# Patient Record
Sex: FEMALE | Race: WHITE | Employment: FULL TIME | URBAN - METROPOLITAN AREA
[De-identification: names, ages, dates, MRNs, and addresses within clinical notes are randomized per-mention and may not be internally consistent; named-entity substitution may affect disease eponyms.]

---

## 2018-01-10 ENCOUNTER — HOSPITAL ENCOUNTER (EMERGENCY)
Age: 48
Discharge: HOME | End: 2018-01-10

## 2018-01-10 ENCOUNTER — APPOINTMENT (OUTPATIENT)
Dept: GENERAL RADIOLOGY | Age: 48
End: 2018-01-10

## 2018-01-10 VITALS
OXYGEN SATURATION: 99 % | HEIGHT: 70 IN | DIASTOLIC BLOOD PRESSURE: 88 MMHG | SYSTOLIC BLOOD PRESSURE: 155 MMHG | BODY MASS INDEX: 17.18 KG/M2 | RESPIRATION RATE: 16 BRPM | HEART RATE: 78 BPM | WEIGHT: 120 LBS | TEMPERATURE: 97.8 F

## 2018-01-10 DIAGNOSIS — S93.401A SPRAIN OF RIGHT ANKLE, UNSPECIFIED LIGAMENT, INITIAL ENCOUNTER: ICD-10-CM

## 2018-01-10 DIAGNOSIS — S40.012A CONTUSION OF LEFT SHOULDER, INITIAL ENCOUNTER: Primary | ICD-10-CM

## 2018-01-10 DIAGNOSIS — S93.601A RIGHT FOOT SPRAIN, INITIAL ENCOUNTER: ICD-10-CM

## 2018-01-10 PROCEDURE — 99283 EMERGENCY DEPT VISIT LOW MDM: CPT

## 2018-01-10 PROCEDURE — 73030 X-RAY EXAM OF SHOULDER: CPT

## 2018-01-10 PROCEDURE — 73630 X-RAY EXAM OF FOOT: CPT

## 2018-01-10 PROCEDURE — 73610 X-RAY EXAM OF ANKLE: CPT

## 2018-01-10 RX ORDER — CHLORZOXAZONE 500 MG/1
500 TABLET ORAL 3 TIMES DAILY PRN
Qty: 15 TABLET | Refills: 0 | Status: SHIPPED | OUTPATIENT
Start: 2018-01-10 | End: 2018-01-20

## 2018-01-10 RX ORDER — NAPROXEN 500 MG/1
500 TABLET ORAL 2 TIMES DAILY WITH MEALS
Qty: 14 TABLET | Refills: 0 | Status: SHIPPED | OUTPATIENT
Start: 2018-01-10

## 2018-01-10 ASSESSMENT — PAIN DESCRIPTION - PAIN TYPE: TYPE: ACUTE PAIN

## 2018-01-10 ASSESSMENT — PAIN DESCRIPTION - DESCRIPTORS: DESCRIPTORS: ACHING;SHOOTING

## 2018-01-10 ASSESSMENT — PAIN DESCRIPTION - LOCATION: LOCATION: SHOULDER;ANKLE

## 2018-01-10 ASSESSMENT — ENCOUNTER SYMPTOMS
RESPIRATORY NEGATIVE: 1
EYES NEGATIVE: 1
GASTROINTESTINAL NEGATIVE: 1

## 2018-01-10 ASSESSMENT — PAIN DESCRIPTION - ORIENTATION: ORIENTATION: LEFT;RIGHT

## 2018-01-10 ASSESSMENT — PAIN SCALES - GENERAL: PAINLEVEL_OUTOF10: 8

## 2018-01-10 NOTE — ED TRIAGE NOTES
Pt slipped on ice, pain with movement to left shoulder and right ankle pain, able to bear weight but pain with walking

## 2018-01-10 NOTE — ED PROVIDER NOTES
[address] [facility] ED  eMERGENCY dEPARTMENT eNCOUnter      Pt Name: Rhett Verma  MRN: [id number]  [place] 1970  Date of evaluation: 1/10/2018  Provider: Cory Forte PA-C      HISTORY OF PRESENT ILLNESS    Rhett Verma is a 56 y.o. female who presents to the Emergency Department with  chief complaint of  Right foot and ankle pain along with left shoulder pain. Patient states she slipped outside twisting her foot and causing her to fall landing on her left shoulder. Injury occurred just prior to arrival.  Patient denies any head or neck injury. Patient states she took two aleve just  prior to arrival and has no other complaints at this time. She denies the need for any additional medication. REVIEW OF SYSTEMS       Review of Systems   Constitutional: Negative. HENT: Negative. Eyes: Negative. Respiratory: Negative. Cardiovascular: Negative. Gastrointestinal: Negative. Endocrine: Negative. Genitourinary: Negative. Musculoskeletal: Positive for arthralgias. Right foot and right ankle left shoulder   Skin: Negative. Neurological: Negative. Psychiatric/Behavioral: Negative. PAST MEDICAL HISTORY     Past Medical History:   Diagnosis Date    Breast [facility] ([facility] [address]     Breast cancer ([facility] [address] 7/9/2014    Cervical cancer ([facility] [address] [facility]     Chest pain 9/8/2014    Chest wall pain following surgery 10/12/2012    Chronic chest wall pain 10/12/2012    S/P mastectomy 7/9/2014    S/P mastectomy, bilateral 10/12/2012    Stress at home 9/8/2014    Stress at home,  9/8/2014    Tobacco abuse 7/9/2014    Tobacco use          SURGICAL HISTORY       Past Surgical History:   Procedure Laterality Date    APPENDECTOMY      BREAST SURGERY  10/2011    both breast masectomy    HYSTERECTOMY  2010    LYMPH NODE BIOPSY      left side         CURRENT MEDICATIONS       Previous Medications    HYDROPHILIC OINTMENT    Apply topically as needed.     KETOROLAC

## 2019-03-29 ENCOUNTER — HOSPITAL ENCOUNTER (EMERGENCY)
Age: 49
Discharge: HOME | End: 2019-03-29
Attending: EMERGENCY MEDICINE

## 2019-03-29 ENCOUNTER — APPOINTMENT (OUTPATIENT)
Dept: GENERAL RADIOLOGY | Age: 49
End: 2019-03-29

## 2019-03-29 VITALS
HEART RATE: 92 BPM | SYSTOLIC BLOOD PRESSURE: 107 MMHG | HEIGHT: 70 IN | WEIGHT: 120 LBS | RESPIRATION RATE: 20 BRPM | DIASTOLIC BLOOD PRESSURE: 68 MMHG | BODY MASS INDEX: 17.18 KG/M2 | OXYGEN SATURATION: 91 % | TEMPERATURE: 99.2 F

## 2019-03-29 DIAGNOSIS — J18.9 PNEUMONIA DUE TO ORGANISM: Primary | ICD-10-CM

## 2019-03-29 DIAGNOSIS — R11.2 NON-INTRACTABLE VOMITING WITH NAUSEA, UNSPECIFIED VOMITING TYPE: ICD-10-CM

## 2019-03-29 LAB
ALBUMIN SERPL-MCNC: 4.4 G/DL (ref 3.5–4.6)
ALP BLD-CCNC: 84 U/L (ref 40–130)
ALT SERPL-CCNC: 8 U/L (ref 0–33)
ANION GAP SERPL CALCULATED.3IONS-SCNC: 15 MEQ/L (ref 9–15)
AST SERPL-CCNC: 21 U/L (ref 0–35)
BASOPHILS ABSOLUTE: 0 K/UL (ref 0–0.2)
BASOPHILS RELATIVE PERCENT: 0.1 %
BILIRUB SERPL-MCNC: 0.3 MG/DL (ref 0.2–0.7)
BUN BLDV-MCNC: 17 MG/DL (ref 6–20)
CALCIUM SERPL-MCNC: 9.2 MG/DL (ref 8.5–9.9)
CHLORIDE BLD-SCNC: 92 MEQ/L (ref 95–107)
CO2: 26 MEQ/L (ref 20–31)
CREAT SERPL-MCNC: 0.76 MG/DL (ref 0.5–0.9)
EKG ATRIAL RATE: 92 BPM
EKG P AXIS: 68 DEGREES
EKG P-R INTERVAL: 134 MS
EKG Q-T INTERVAL: 364 MS
EKG QRS DURATION: 80 MS
EKG QTC CALCULATION (BAZETT): 450 MS
EKG R AXIS: -25 DEGREES
EKG T AXIS: 11 DEGREES
EKG VENTRICULAR RATE: 92 BPM
EOSINOPHILS ABSOLUTE: 0 K/UL (ref 0–0.7)
EOSINOPHILS RELATIVE PERCENT: 0 %
GFR AFRICAN AMERICAN: >60
GFR NON-AFRICAN AMERICAN: >60
GLOBULIN: 2.9 G/DL (ref 2.3–3.5)
GLUCOSE BLD-MCNC: 121 MG/DL (ref 70–99)
HCT VFR BLD CALC: 38.5 % (ref 37–47)
HEMOGLOBIN: 13.2 G/DL (ref 12–16)
LYMPHOCYTES ABSOLUTE: 0.7 K/UL (ref 1–4.8)
LYMPHOCYTES RELATIVE PERCENT: 5.4 %
MAGNESIUM: 1.9 MG/DL (ref 1.7–2.4)
MCH RBC QN AUTO: 32.1 PG (ref 27–31.3)
MCHC RBC AUTO-ENTMCNC: 34.3 % (ref 33–37)
MCV RBC AUTO: 93.8 FL (ref 82–100)
MONOCYTES ABSOLUTE: 0.8 K/UL (ref 0.2–0.8)
MONOCYTES RELATIVE PERCENT: 6.4 %
NEUTROPHILS ABSOLUTE: 11.1 K/UL (ref 1.4–6.5)
NEUTROPHILS RELATIVE PERCENT: 88.1 %
PDW BLD-RTO: 13.6 % (ref 11.5–14.5)
PLATELET # BLD: 155 K/UL (ref 130–400)
POTASSIUM SERPL-SCNC: 4 MEQ/L (ref 3.4–4.9)
RAPID INFLUENZA  B AGN: NEGATIVE
RAPID INFLUENZA A AGN: NEGATIVE
RBC # BLD: 4.1 M/UL (ref 4.2–5.4)
SODIUM BLD-SCNC: 133 MEQ/L (ref 135–144)
TOTAL PROTEIN: 7.3 G/DL (ref 6.3–8)
WBC # BLD: 12.6 K/UL (ref 4.8–10.8)

## 2019-03-29 PROCEDURE — 85025 COMPLETE CBC W/AUTO DIFF WBC: CPT

## 2019-03-29 PROCEDURE — 99284 EMERGENCY DEPT VISIT MOD MDM: CPT

## 2019-03-29 PROCEDURE — 6370000000 HC RX 637 (ALT 250 FOR IP): Performed by: EMERGENCY MEDICINE

## 2019-03-29 PROCEDURE — 93005 ELECTROCARDIOGRAM TRACING: CPT

## 2019-03-29 PROCEDURE — 96372 THER/PROPH/DIAG INJ SC/IM: CPT

## 2019-03-29 PROCEDURE — 80053 COMPREHEN METABOLIC PANEL: CPT

## 2019-03-29 PROCEDURE — 87804 INFLUENZA ASSAY W/OPTIC: CPT

## 2019-03-29 PROCEDURE — 96374 THER/PROPH/DIAG INJ IV PUSH: CPT

## 2019-03-29 PROCEDURE — 2580000003 HC RX 258: Performed by: EMERGENCY MEDICINE

## 2019-03-29 PROCEDURE — 96375 TX/PRO/DX INJ NEW DRUG ADDON: CPT

## 2019-03-29 PROCEDURE — 36415 COLL VENOUS BLD VENIPUNCTURE: CPT

## 2019-03-29 PROCEDURE — 71046 X-RAY EXAM CHEST 2 VIEWS: CPT

## 2019-03-29 PROCEDURE — 6360000002 HC RX W HCPCS: Performed by: EMERGENCY MEDICINE

## 2019-03-29 PROCEDURE — 83735 ASSAY OF MAGNESIUM: CPT

## 2019-03-29 RX ORDER — PROMETHAZINE HYDROCHLORIDE 25 MG/ML
25 INJECTION, SOLUTION INTRAMUSCULAR; INTRAVENOUS ONCE
Status: COMPLETED | OUTPATIENT
Start: 2019-03-29 | End: 2019-03-29

## 2019-03-29 RX ORDER — LEVOFLOXACIN 750 MG/1
750 TABLET ORAL DAILY
Qty: 7 TABLET | Refills: 0 | Status: SHIPPED | OUTPATIENT
Start: 2019-03-29 | End: 2019-04-05

## 2019-03-29 RX ORDER — ONDANSETRON 2 MG/ML
4 INJECTION INTRAMUSCULAR; INTRAVENOUS ONCE
Status: COMPLETED | OUTPATIENT
Start: 2019-03-29 | End: 2019-03-29

## 2019-03-29 RX ORDER — 0.9 % SODIUM CHLORIDE 0.9 %
1000 INTRAVENOUS SOLUTION INTRAVENOUS ONCE
Status: COMPLETED | OUTPATIENT
Start: 2019-03-29 | End: 2019-03-29

## 2019-03-29 RX ORDER — KETOROLAC TROMETHAMINE 30 MG/ML
30 INJECTION, SOLUTION INTRAMUSCULAR; INTRAVENOUS ONCE
Status: COMPLETED | OUTPATIENT
Start: 2019-03-29 | End: 2019-03-29

## 2019-03-29 RX ORDER — ONDANSETRON 4 MG/1
4 TABLET, ORALLY DISINTEGRATING ORAL 3 TIMES DAILY PRN
Qty: 21 TABLET | Refills: 0 | Status: SHIPPED | OUTPATIENT
Start: 2019-03-29

## 2019-03-29 RX ORDER — PROMETHAZINE HYDROCHLORIDE 25 MG/1
25 TABLET ORAL 4 TIMES DAILY PRN
Qty: 20 TABLET | Refills: 0 | Status: SHIPPED | OUTPATIENT
Start: 2019-03-29 | End: 2019-04-05

## 2019-03-29 RX ORDER — ACETAMINOPHEN 500 MG
1000 TABLET ORAL ONCE
Status: COMPLETED | OUTPATIENT
Start: 2019-03-29 | End: 2019-03-29

## 2019-03-29 RX ADMIN — KETOROLAC TROMETHAMINE 30 MG: 30 INJECTION, SOLUTION INTRAMUSCULAR; INTRAVENOUS at 20:02

## 2019-03-29 RX ADMIN — ACETAMINOPHEN 1000 MG: 500 TABLET ORAL at 20:02

## 2019-03-29 RX ADMIN — SODIUM CHLORIDE 1000 ML: 9 INJECTION, SOLUTION INTRAVENOUS at 20:02

## 2019-03-29 RX ADMIN — ONDANSETRON 4 MG: 2 INJECTION INTRAMUSCULAR; INTRAVENOUS at 20:02

## 2019-03-29 RX ADMIN — PROMETHAZINE HYDROCHLORIDE 25 MG: 25 INJECTION INTRAMUSCULAR; INTRAVENOUS at 20:02

## 2019-03-29 ASSESSMENT — PAIN DESCRIPTION - PAIN TYPE: TYPE: ACUTE PAIN

## 2019-03-29 ASSESSMENT — ENCOUNTER SYMPTOMS
SORE THROAT: 0
ABDOMINAL PAIN: 0
NAUSEA: 1
RHINORRHEA: 1
COUGH: 1
BACK PAIN: 0
VOMITING: 1
SHORTNESS OF BREATH: 0
DIARRHEA: 0

## 2019-03-29 ASSESSMENT — PAIN DESCRIPTION - LOCATION: LOCATION: GENERALIZED

## 2019-03-29 ASSESSMENT — PAIN DESCRIPTION - ONSET: ONSET: ON-GOING

## 2019-03-29 ASSESSMENT — PAIN DESCRIPTION - FREQUENCY: FREQUENCY: CONTINUOUS

## 2019-03-29 ASSESSMENT — PAIN SCALES - GENERAL
PAINLEVEL_OUTOF10: 6
PAINLEVEL_OUTOF10: 6

## 2019-03-29 ASSESSMENT — PAIN DESCRIPTION - DESCRIPTORS: DESCRIPTORS: ACHING

## 2019-03-29 NOTE — ED TRIAGE NOTES
Patient came to the ED today for nausea, vomiting, diarrhea, body aches/chills, fever, loss of appetite x3 days. Patient states it's gotten worse as the days progressed. Denies HA, diplopia, blurry vision, chest pain, ABD pain, pain/burning with urination.  A&Ox4

## 2019-03-29 NOTE — ED PROVIDER NOTES
[address] [facility] ED  eMERGENCYdEPARTMENT eNCOUnter      Pt Name: Tomasa Ahuja  MRN: [id number]  [place] 1970  Date of evaluation: 3/29/2019  Liliam Stubbs MD    CHIEF COMPLAINT           HPI  Tomasa Ahuja is a 53 y.o. female per chart review has a h/o Breast Ca s/p mastectomy, depression presents to the ED with n/v, rhinorrhea, cough. Pt notes about 10 episodes per day of non bilious, non bloody vomiting x 3 days. Pt notes rhinorrhea, cough x 1 week. Pt also notes gradual onset, moderate, constant, diffuse, body aches. ROS  Review of Systems   Constitutional: Negative for activity change, chills and fever. HENT: Positive for rhinorrhea. Negative for ear pain and sore throat. Eyes: Negative for visual disturbance. Respiratory: Positive for cough. Negative for shortness of breath. Cardiovascular: Negative for chest pain, palpitations and leg swelling. Gastrointestinal: Positive for nausea and vomiting. Negative for abdominal pain and diarrhea. Genitourinary: Negative for dysuria. Musculoskeletal: Positive for myalgias. Negative for back pain. Skin: Negative for rash. Neurological: Negative for dizziness and weakness. Except as noted above the remainder of the review of systems was reviewed and negative.        PAST MEDICAL HISTORY     Past Medical History:   Diagnosis Date    Breast [facility] ([facility] [address]     Breast cancer ([facility] [address] 7/9/2014    Cervical cancer ([facility] [address] [facility]     Chest pain 9/8/2014    Chest wall pain following surgery 10/12/2012    Chronic chest wall pain 10/12/2012    S/P mastectomy 7/9/2014    S/P mastectomy, bilateral 10/12/2012    Stress at home 9/8/2014    Stress at home,  9/8/2014    Tobacco abuse 7/9/2014    Tobacco use          SURGICAL HISTORY       Past Surgical History:   Procedure Laterality Date    APPENDECTOMY      BREAST SURGERY  10/2011    both breast masectomy    HYSTERECTOMY  2010    LYMPH NODE BIOPSY      left side CURRENTMEDICATIONS       Previous Medications    HYDROPHILIC OINTMENT    Apply topically as needed. KETOROLAC (TORADOL) 10 MG TABLET    Take 1 tablet by mouth every 6 hours as needed for Pain. MIRTAZAPINE (REMERON) 15 MG TABLET    Take 1 tablet by mouth nightly. NAPROXEN (NAPROSYN) 500 MG TABLET    Take 1 tablet by mouth 2 times daily (with meals)    SERTRALINE (ZOLOFT) 50 MG TABLET    Take 1 tablet by mouth daily.        ALLERGIES     Pcn [penicillins] and Tramadol    FAMILY HISTORY       Family History   Problem Relation Age of Onset   [facility] Cancer Mother     Depression Mother     Diabetes Father     High Blood Pressure Father     Depression Sister     Diabetes Brother     Cancer Maternal Grandmother     Depression Maternal Grandmother           SOCIAL HISTORY       Social History     Socioeconomic History    Marital status:      Spouse name: None    Number of children: None    Years of education: None    Highest education level: None   Occupational History    None   Social Needs    Financial resource strain: None    Food insecurity:     Worry: None     Inability: None    Transportation needs:     Medical: None     Non-medical: None   Tobacco Use    Smoking status: Current Every Day Smoker     Packs/day: 1.00     Years: 26.00     Pack years: 26.00     Types: Cigarettes    Smokeless tobacco: Never Used   Substance and Sexual Activity    Alcohol use: No    Drug use: Yes     Frequency: 3.0 times per week     Types: Marijuana     Comment: she states it was recommended by ca doctors in [facility] Sexual activity: Never   Lifestyle    Physical activity:     Days per week: None     Minutes per session: None    Stress: None   Relationships    Social connections:     Talks on phone: None     Gets together: None     Attends Shinto service: None     Active member of club or organization: None     Attends meetings of clubs or organizations: None     Relationship status: None  Intimate partner violence:     Fear of current or ex partner: None     Emotionally abused: None     Physically abused: None     Forced sexual activity: None   Other Topics Concern    None   Social History Narrative    None         PHYSICAL EXAM       ED Triage Vitals [03/29/19 1928]   BP Temp Temp Source Pulse Resp SpO2 Height Weight   120/70 99.2 °F (37.3 °C) Oral 106 20 95 % 5' 10\" (1.778 m) 120 lb (54.4 kg)       Physical Exam   Constitutional: She is oriented to person, place, and time. She appears well-developed. HENT:   Head: Normocephalic. Right Ear: External ear normal.   Left Ear: External ear normal.   Mouth/Throat: Oropharynx is clear and moist.   Eyes: Pupils are equal, round, and reactive to light. Conjunctivae are normal.   Neck: Normal range of motion. Neck supple. Cardiovascular: Normal rate, regular rhythm and normal heart sounds. Pulmonary/Chest: Effort normal and breath sounds normal.   Abdominal: Soft. Bowel sounds are normal. She exhibits no distension. There is no tenderness. Musculoskeletal: Normal range of motion. Neurological: She is alert and oriented to person, place, and time. Skin: Skin is warm and dry. Psychiatric: She has a normal mood and affect. Nursing note and vitals reviewed. MDM  52 yo female presents to the ED with n/v, rhinorrhea, cough, myalgias. Pt is afebrile, hemodynamically stable in the ED. Pt given 1 L NS, IV toradol, PO tylenol, IV zofran, IM phenergan with moderate relief. EKG shows NSR with HR 92, normal axis, normal intervals, no ST changes. Labs remarkable for WBC 12, Na 133. Flu negative. CXR shows R perhilar and L perihilar infiltrate. Pt requesting to go home. Pt educated about the results. Pt smokes. Pt counseled on smoking cessation x 6 minutes and advised about how smoking is making his condition worse. Pt given prescription for levaquin, zofran, phenergan. Pt states she would refuse admission.   Pt advised to return if she felt worse. Pt given n/v, pneumonia warning signs. FINAL IMPRESSION      1. Pneumonia due to organism    2.  Non-intractable vomiting with nausea, unspecified vomiting type          DISPOSITION/PLAN   DISPOSITION Decision To Discharge 03/29/2019 09:16:48 PM        DISCHARGE MEDICATIONS:  [unfilled]         Desi Osorio MD(electronically signed)  Attending Emergency Physician            Desi Osorio MD  03/29/19 2116

## 2019-03-30 NOTE — ED NOTES
Dr Lorena Perez notified that the patient needed to be on 2-3L O2 to maintain a pulse ox of 93%. Pt does not wear O2 at home.      Fanny Raymond RN  03/29/19 1160

## 2019-03-30 NOTE — ED NOTES
EKG completed by this ED Tech. Patient tolerated well. EKG resulted/transmitted. EKG was given to [facility].       Elzbieta Velasquez  03/29/19 2008

## 2019-04-01 PROCEDURE — 93010 ELECTROCARDIOGRAM REPORT: CPT | Performed by: INTERNAL MEDICINE

## 2020-03-12 ENCOUNTER — APPOINTMENT (OUTPATIENT)
Dept: GENERAL RADIOLOGY | Age: 50
End: 2020-03-12
Payer: COMMERCIAL

## 2020-03-12 ENCOUNTER — HOSPITAL ENCOUNTER (EMERGENCY)
Age: 50
Discharge: HOME | End: 2020-03-12
Payer: COMMERCIAL

## 2020-03-12 VITALS
BODY MASS INDEX: 17.18 KG/M2 | SYSTOLIC BLOOD PRESSURE: 118 MMHG | RESPIRATION RATE: 20 BRPM | HEIGHT: 70 IN | OXYGEN SATURATION: 100 % | WEIGHT: 120 LBS | DIASTOLIC BLOOD PRESSURE: 68 MMHG | TEMPERATURE: 98.3 F | HEART RATE: 68 BPM

## 2020-03-12 LAB
ALBUMIN SERPL-MCNC: 4.7 G/DL (ref 3.5–4.6)
ALP BLD-CCNC: 95 U/L (ref 40–130)
ALT SERPL-CCNC: 8 U/L (ref 0–33)
ANION GAP SERPL CALCULATED.3IONS-SCNC: 11 MEQ/L (ref 9–15)
AST SERPL-CCNC: 13 U/L (ref 0–35)
BASOPHILS ABSOLUTE: 0.1 K/UL (ref 0–0.2)
BASOPHILS RELATIVE PERCENT: 0.8 %
BILIRUB SERPL-MCNC: 0.3 MG/DL (ref 0.2–0.7)
BILIRUBIN URINE: NEGATIVE
BLOOD, URINE: NEGATIVE
BUN BLDV-MCNC: 5 MG/DL (ref 6–20)
CALCIUM SERPL-MCNC: 9.8 MG/DL (ref 8.5–9.9)
CHLORIDE BLD-SCNC: 96 MEQ/L (ref 95–107)
CLARITY: CLEAR
CO2: 28 MEQ/L (ref 20–31)
COLOR: YELLOW
CREAT SERPL-MCNC: 0.73 MG/DL (ref 0.5–0.9)
EKG ATRIAL RATE: 64 BPM
EKG P AXIS: 65 DEGREES
EKG P-R INTERVAL: 162 MS
EKG Q-T INTERVAL: 410 MS
EKG QRS DURATION: 80 MS
EKG QTC CALCULATION (BAZETT): 422 MS
EKG R AXIS: 19 DEGREES
EKG T AXIS: 46 DEGREES
EKG VENTRICULAR RATE: 64 BPM
EOSINOPHILS ABSOLUTE: 0 K/UL (ref 0–0.7)
EOSINOPHILS RELATIVE PERCENT: 0.3 %
GFR AFRICAN AMERICAN: >60
GFR NON-AFRICAN AMERICAN: >60
GLOBULIN: 3.1 G/DL (ref 2.3–3.5)
GLUCOSE BLD-MCNC: 93 MG/DL (ref 70–99)
GLUCOSE URINE: NEGATIVE MG/DL
HCT VFR BLD CALC: 38.9 % (ref 37–47)
HEMOGLOBIN: 13.3 G/DL (ref 12–16)
INFLUENZA A BY PCR: NEGATIVE
INFLUENZA B BY PCR: NEGATIVE
KETONES, URINE: NEGATIVE MG/DL
LEUKOCYTE ESTERASE, URINE: NEGATIVE
LYMPHOCYTES ABSOLUTE: 3.3 K/UL (ref 1–4.8)
LYMPHOCYTES RELATIVE PERCENT: 30.8 %
MAGNESIUM: 2.1 MG/DL (ref 1.7–2.4)
MCH RBC QN AUTO: 33.1 PG (ref 27–31.3)
MCHC RBC AUTO-ENTMCNC: 34.1 % (ref 33–37)
MCV RBC AUTO: 97.2 FL (ref 82–100)
MONOCYTES ABSOLUTE: 0.6 K/UL (ref 0.2–0.8)
MONOCYTES RELATIVE PERCENT: 5.6 %
NEUTROPHILS ABSOLUTE: 6.7 K/UL (ref 1.4–6.5)
NEUTROPHILS RELATIVE PERCENT: 62.5 %
NITRITE, URINE: NEGATIVE
PDW BLD-RTO: 14.1 % (ref 11.5–14.5)
PH UA: 6 (ref 5–9)
PLATELET # BLD: 293 K/UL (ref 130–400)
PLATELET SLIDE REVIEW: ADEQUATE
POTASSIUM SERPL-SCNC: 3.9 MEQ/L (ref 3.4–4.9)
PROTEIN UA: NEGATIVE MG/DL
RBC # BLD: 4 M/UL (ref 4.2–5.4)
SODIUM BLD-SCNC: 135 MEQ/L (ref 135–144)
SPECIFIC GRAVITY UA: 1 (ref 1–1.03)
STREP GRP A PCR: NEGATIVE
TOTAL PROTEIN: 7.8 G/DL (ref 6.3–8)
TROPONIN: <0.01 NG/ML (ref 0–0.01)
URINE REFLEX TO CULTURE: NORMAL
UROBILINOGEN, URINE: 0.2 E.U./DL
WBC # BLD: 10.7 K/UL (ref 4.8–10.8)

## 2020-03-12 PROCEDURE — 87651 STREP A DNA AMP PROBE: CPT

## 2020-03-12 PROCEDURE — 94640 AIRWAY INHALATION TREATMENT: CPT

## 2020-03-12 PROCEDURE — 80053 COMPREHEN METABOLIC PANEL: CPT

## 2020-03-12 PROCEDURE — 84484 ASSAY OF TROPONIN QUANT: CPT

## 2020-03-12 PROCEDURE — 99284 EMERGENCY DEPT VISIT MOD MDM: CPT

## 2020-03-12 PROCEDURE — 87502 INFLUENZA DNA AMP PROBE: CPT

## 2020-03-12 PROCEDURE — 93005 ELECTROCARDIOGRAM TRACING: CPT | Performed by: PHYSICIAN ASSISTANT

## 2020-03-12 PROCEDURE — 6370000000 HC RX 637 (ALT 250 FOR IP): Performed by: PHYSICIAN ASSISTANT

## 2020-03-12 PROCEDURE — 94761 N-INVAS EAR/PLS OXIMETRY MLT: CPT

## 2020-03-12 PROCEDURE — 83735 ASSAY OF MAGNESIUM: CPT

## 2020-03-12 PROCEDURE — 81003 URINALYSIS AUTO W/O SCOPE: CPT

## 2020-03-12 PROCEDURE — 71046 X-RAY EXAM CHEST 2 VIEWS: CPT

## 2020-03-12 PROCEDURE — 85025 COMPLETE CBC W/AUTO DIFF WBC: CPT

## 2020-03-12 PROCEDURE — 36415 COLL VENOUS BLD VENIPUNCTURE: CPT

## 2020-03-12 RX ORDER — ALBUTEROL SULFATE 90 UG/1
AEROSOL, METERED RESPIRATORY (INHALATION)
Qty: 1 INHALER | Refills: 1 | Status: SHIPPED | OUTPATIENT
Start: 2020-03-12

## 2020-03-12 RX ORDER — IPRATROPIUM BROMIDE AND ALBUTEROL SULFATE 2.5; .5 MG/3ML; MG/3ML
1 SOLUTION RESPIRATORY (INHALATION) CONTINUOUS PRN
Status: DISCONTINUED | OUTPATIENT
Start: 2020-03-12 | End: 2020-03-12 | Stop reason: HOSPADM

## 2020-03-12 RX ORDER — DOXYCYCLINE 100 MG/1
100 TABLET ORAL 2 TIMES DAILY
Qty: 20 TABLET | Refills: 0 | Status: SHIPPED | OUTPATIENT
Start: 2020-03-12 | End: 2020-03-22

## 2020-03-12 RX ADMIN — IPRATROPIUM BROMIDE AND ALBUTEROL SULFATE 1 AMPULE: .5; 3 SOLUTION RESPIRATORY (INHALATION) at 15:57

## 2020-03-12 ASSESSMENT — ENCOUNTER SYMPTOMS
EYE DISCHARGE: 0
VOICE CHANGE: 0
ANAL BLEEDING: 0
SORE THROAT: 1
ABDOMINAL DISTENTION: 0
NAUSEA: 0
ABDOMINAL PAIN: 0
PHOTOPHOBIA: 0
APNEA: 0
EYE REDNESS: 0
SHORTNESS OF BREATH: 1
VOMITING: 0
COUGH: 1
BACK PAIN: 0
EYE PAIN: 0

## 2020-03-12 NOTE — ED PROVIDER NOTES
 Depression Maternal Grandmother           SOCIAL HISTORY       Social History     Socioeconomic History    Marital status:      Spouse name: None    Number of children: None    Years of education: None    Highest education level: None   Occupational History    None   Social Needs    Financial resource strain: None    Food insecurity     Worry: None     Inability: None    Transportation needs     Medical: None     Non-medical: None   Tobacco Use    Smoking status: Current Every Day Smoker     Packs/day: 2.00     Years: 26.00     Pack years: 52.00     Types: Cigarettes    Smokeless tobacco: Never Used   Substance and Sexual Activity    Alcohol use: No    Drug use: Yes     Frequency: 3.0 times per week     Types: Marijuana     Comment: she states it was recommended by ca doctors in [facility] Sexual activity: Never   Lifestyle    Physical activity     Days per week: None     Minutes per session: None    Stress: None   Relationships    Social connections     Talks on phone: None     Gets together: None     Attends Holiness service: None     Active member of club or organization: None     Attends meetings of clubs or organizations: None     Relationship status: None    Intimate partner violence     Fear of current or ex partner: None     Emotionally abused: None     Physically abused: None     Forced sexual activity: None   Other Topics Concern    None   Social History Narrative    None       SCREENINGS      @FLOW([id number])@      PHYSICAL EXAM    (up to 7 for level 4, 8 or more for level 5)     ED Triage Vitals [03/12/20 1431]   BP Temp Temp Source Pulse Resp SpO2 Height Weight   120/61 98.3 °F (36.8 °C) Oral 75 19 94 % 5' 10\" (1.778 m) 120 lb (54.4 kg)       Physical Exam  Vitals signs and nursing note reviewed. Constitutional:       General: She is not in acute distress. Appearance: She is well-developed. She is not ill-appearing, toxic-appearing or diaphoretic.    HENT: available at the time ofthis note:    XR CHEST STANDARD (2 VW)    (Results Pending)         ED BEDSIDE ULTRASOUND:   Performed by ED Physician - none    LABS:  Labs Reviewed   COMPREHENSIVE METABOLIC PANEL - Abnormal; Notable for the following components:       Result Value    BUN 5 (*)     Alb 4.7 (*)     All other components within normal limits   CBC WITH AUTO DIFFERENTIAL - Abnormal; Notable for the following components:    RBC 4.00 (*)     MCH 33.1 (*)     All other components within normal limits   RAPID INFLUENZA A/B ANTIGENS   RAPID STREP SCREEN   MAGNESIUM   TROPONIN   URINE RT REFLEX TO CULTURE       All other labs were within normal range or not returned as of this dictation. EMERGENCY DEPARTMENT COURSE and DIFFERENTIAL DIAGNOSIS/MDM:   Vitals:    Vitals:    03/12/20 1431 03/12/20 1532 03/12/20 1559 03/12/20 1603   BP: 120/61 118/68  123/77   Pulse: 75 68  76   Resp: 19 20  18   Temp: 98.3 °F (36.8 °C)      TempSrc: Oral      SpO2: 94% 100% 100% 98%   Weight: 120 lb (54.4 kg)      Height: 5' 10\" (1.778 m)               MDM  Number of Diagnoses or Management Options  Diagnosis management comments: Perc score reviewed. Negative criteria. After breathing treatment patient's lungs have cleared negative wheezing patient feels better states her headache has gone away she is smiling. We discussed the need for follow-up with her primary care physician return to ER if any symptoms worsen or new symptoms develop. We will treat for UTI. Amount and/or Complexity of Data Reviewed  Clinical lab tests: reviewed        CRITICAL CARE TIME       CONSULTS:  None    PROCEDURES:  Unless otherwise noted below, none     Procedures    FINAL IMPRESSION      1. Chronic obstructive pulmonary disease, unspecified COPD type ([facility] [address]    2. Urinary tract infection without hematuria, site unspecified    3.  Syncope, unspecified syncope type          DISPOSITION/PLAN   DISPOSITION Decision To Discharge 03/12/2020 04:33:05

## 2020-03-12 NOTE — ED NOTES
Patient to x-ray with steady gait. Lungs clear in all lobes after treatment. States that she feels much better.       Cesar Akhtar RN  03/12/20 [id number]

## 2020-03-13 PROCEDURE — 93010 ELECTROCARDIOGRAM REPORT: CPT | Performed by: INTERNAL MEDICINE

## 2020-10-07 ENCOUNTER — APPOINTMENT (OUTPATIENT)
Dept: GENERAL RADIOLOGY | Age: 50
End: 2020-10-07
Payer: COMMERCIAL

## 2020-10-07 ENCOUNTER — HOSPITAL ENCOUNTER (EMERGENCY)
Age: 50
Discharge: HOME | End: 2020-10-07
Payer: COMMERCIAL

## 2020-10-07 VITALS
WEIGHT: 115 LBS | HEIGHT: 70 IN | SYSTOLIC BLOOD PRESSURE: 109 MMHG | RESPIRATION RATE: 17 BRPM | OXYGEN SATURATION: 96 % | BODY MASS INDEX: 16.46 KG/M2 | DIASTOLIC BLOOD PRESSURE: 70 MMHG | HEART RATE: 79 BPM | TEMPERATURE: 97.8 F

## 2020-10-07 LAB — SARS-COV-2, NAAT: NOT DETECTED

## 2020-10-07 PROCEDURE — 99282 EMERGENCY DEPT VISIT SF MDM: CPT

## 2020-10-07 PROCEDURE — 99283 EMERGENCY DEPT VISIT LOW MDM: CPT

## 2020-10-07 PROCEDURE — 71046 X-RAY EXAM CHEST 2 VIEWS: CPT

## 2020-10-07 PROCEDURE — U0003 INFECTIOUS AGENT DETECTION BY NUCLEIC ACID (DNA OR RNA); SEVERE ACUTE RESPIRATORY SYNDROME CORONAVIRUS 2 (SARS-COV-2) (CORONAVIRUS DISEASE [COVID-19]), AMPLIFIED PROBE TECHNIQUE, MAKING USE OF HIGH THROUGHPUT TECHNOLOGIES AS DESCRIBED BY CMS-2020-01-R: HCPCS

## 2020-10-07 PROCEDURE — U0002 COVID-19 LAB TEST NON-CDC: HCPCS

## 2020-10-07 RX ORDER — BENZONATATE 100 MG/1
100 CAPSULE ORAL 3 TIMES DAILY PRN
Qty: 20 CAPSULE | Refills: 0 | Status: SHIPPED | OUTPATIENT
Start: 2020-10-07

## 2020-10-07 RX ORDER — PREDNISONE 20 MG/1
40 TABLET ORAL DAILY
Qty: 20 TABLET | Refills: 0 | Status: SHIPPED | OUTPATIENT
Start: 2020-10-07 | End: 2020-10-17

## 2020-10-07 RX ORDER — ALBUTEROL SULFATE 90 UG/1
AEROSOL, METERED RESPIRATORY (INHALATION)
Qty: 1 INHALER | Refills: 1 | Status: SHIPPED | OUTPATIENT
Start: 2020-10-07

## 2020-10-07 ASSESSMENT — ENCOUNTER SYMPTOMS
VOMITING: 0
EYE DISCHARGE: 0
EYE PAIN: 0
WHEEZING: 0
EYE REDNESS: 0
BACK PAIN: 0
BLOOD IN STOOL: 0
SORE THROAT: 0
TROUBLE SWALLOWING: 0
DIARRHEA: 0
ABDOMINAL PAIN: 0
COUGH: 0
SHORTNESS OF BREATH: 0
CONSTIPATION: 0
NAUSEA: 0
COLOR CHANGE: 0
RHINORRHEA: 0

## 2020-10-07 ASSESSMENT — PAIN DESCRIPTION - PAIN TYPE: TYPE: ACUTE PAIN

## 2020-10-07 ASSESSMENT — PAIN SCALES - GENERAL: PAINLEVEL_OUTOF10: 10

## 2020-10-07 ASSESSMENT — PAIN DESCRIPTION - LOCATION: LOCATION: HEAD

## 2020-10-07 ASSESSMENT — PAIN DESCRIPTION - DESCRIPTORS: DESCRIPTORS: SHARP;STABBING

## 2020-10-07 ASSESSMENT — PAIN DESCRIPTION - FREQUENCY: FREQUENCY: CONTINUOUS

## 2020-10-07 NOTE — ED PROVIDER NOTES
[address] [facility] ED  EMERGENCY DEPARTMENT ENCOUNTER      Pt Name: Mark Blackmon  MRN: [id number]  [place] 1970  Date of evaluation: 10/7/2020  Provider: JENN Rosales CNP    CHIEF COMPLAINT       Chief Complaint   Patient presents with    Fever     102.4; pt also reports nausea, h/a, loss of taste/smell         HISTORY OF PRESENT ILLNESS   (Location/Symptom, Timing/Onset,Context/Setting, Quality, Duration, Modifying Factors, Severity)  Note limiting factors. Mark Blackmon is a 51 y.o. female who presents to the emergency department with a chart reviewed past medical history of breast cancer, cervical cancer, and tobacco abuse for chief complaint of sudden onset of fever of 102.4, nausea, headaches, loss of taste and loss of smell since yesterday. She reports exposure to COVID-19. She states that she is having a dry cough with no productive sputum. She is denying shortness of breath or chest pain. She has no alleviating or modifying factors at this time. Nursing Notes were reviewed. REVIEW OF SYSTEMS    (2-9 systems for level 4, 10 or more for level 5)     Review of Systems   Constitutional: Positive for fever. Negative for activity change, appetite change and fatigue. Loss of Taste+  Loss of Smell+   HENT: Negative for congestion, ear pain, rhinorrhea, sore throat and trouble swallowing. Eyes: Negative for pain, discharge and redness. Respiratory: Negative for cough, shortness of breath and wheezing. Cardiovascular: Negative for chest pain and palpitations. Gastrointestinal: Negative for abdominal pain, blood in stool, constipation, diarrhea, nausea and vomiting. Endocrine: Negative for polydipsia and polyuria. Genitourinary: Negative for decreased urine volume, dysuria, flank pain and hematuria. Musculoskeletal: Negative for arthralgias, back pain and myalgias. Skin: Negative for color change, rash and wound. Neurological: Positive for headaches. Negative for dizziness, syncope, weakness and light-headedness. Psychiatric/Behavioral: Negative for behavioral problems. All other systems reviewed and are negative. Except as noted above the remainder of the review of systems was reviewed and negative.        PAST MEDICAL HISTORY     Past Medical History:   Diagnosis Date    [facility]     Breast cancer ([facility] 7/9/2014    Cervical cancer ([facility]     Chest pain 9/8/2014    Chest wall pain following surgery 10/12/2012    Chronic chest wall pain 10/12/2012    S/P mastectomy 7/9/2014    S/P mastectomy, bilateral 10/12/2012    Stress at home 9/8/2014    Stress at home,  9/8/2014    Tobacco abuse 7/9/2014    Tobacco use      Past Surgical History:   Procedure Laterality Date    APPENDECTOMY      HYSTERECTOMY  2010    LYMPH NODE BIOPSY      left side    MASTECTOMY, BILATERAL Bilateral 10/2011     Social History     Socioeconomic History    Marital status:      Spouse name: None    Number of children: None    Years of education: None    Highest education level: None   Occupational History    None   Social Needs    Financial resource strain: None    Food insecurity     Worry: None     Inability: None    Transportation needs     Medical: None     Non-medical: None   Tobacco Use    Smoking status: Current Every Day Smoker     Packs/day: 2.00     Years: 26.00     Pack years: 52.00     Types: Cigarettes    Smokeless tobacco: Never Used   Substance and Sexual Activity    Alcohol use: No    Drug use: Yes     Frequency: 3.0 times per week     Types: Marijuana     Comment: she states it was recommended by ca doctors in [facility] Sexual activity: Never   Lifestyle    Physical activity     Days per week: None     Minutes per session: None    Stress: None   Relationships    Social connections     Talks on phone: None     Gets together: None     Attends Confucianist service: None     Active member of club or organization: None     Attends meetings of clubs or organizations: None     Relationship status: None    Intimate partner violence     Fear of current or ex partner: None     Emotionally abused: None     Physically abused: None     Forced sexual activity: None   Other Topics Concern    None   Social History Narrative    None       SCREENINGS             PHYSICAL EXAM    (up to 7 for level 4, 8 or more for level 5)     ED Triage Vitals [10/07/20 1240]   BP Temp Temp Source Pulse Resp SpO2 Height Weight   109/70 97.8 °F (36.6 °C) Oral 79 17 96 % 5' 10\" (1.778 m) 115 lb (52.2 kg)       Physical Exam  Vitals signs and nursing note reviewed. Constitutional:       General: She is not in acute distress. Appearance: She is well-developed. She is not diaphoretic. HENT:      Head: Normocephalic and atraumatic. Nose: Nose normal.   Eyes:      Conjunctiva/sclera: Conjunctivae normal.      Pupils: Pupils are equal, round, and reactive to light. Neck:      Musculoskeletal: Normal range of motion and neck supple. Cardiovascular:      Rate and Rhythm: Normal rate and regular rhythm. Heart sounds: Normal heart sounds. Pulmonary:      Effort: Pulmonary effort is normal. No respiratory distress. Breath sounds: Normal breath sounds. Abdominal:      General: Bowel sounds are normal.      Palpations: Abdomen is soft. Tenderness: There is no abdominal tenderness. Skin:     General: Skin is warm and dry. Capillary Refill: Capillary refill takes less than 2 seconds. Findings: No rash. Neurological:      Mental Status: She is alert and oriented to person, place, and time. Cranial Nerves: No cranial nerve deficit.    Psychiatric:         Behavior: Behavior normal.         RESULTS     EKG: All EKG's are interpreted by the Emergency Department Physician who either signs or Co-signsthis chart in the absence of a cardiologist.        RADIOLOGY:   Non-plain filmimages such as CT, Ultrasound and MRI are read by the radiologist. Plain radiographic images are visualized and preliminarily interpreted by the emergency physician with the below findings:    NAD    Interpretation per the Radiologist below, if available at the time ofthis note:    XR CHEST (2 VW)   Final Result   BILATERAL PULMONARY HYPERINFLATION. OTHERWISE NEGATIVE PLAIN FILM ASSESSMENT OF THE CHEST            ED BEDSIDE ULTRASOUND:   Performed by ED Physician - none    LABS:  Labs Reviewed   COVID-19 AMBULATORY   COVID-19       All other labs were within normal range or not returned as of this dictation. EMERGENCY DEPARTMENT COURSE and DIFFERENTIAL DIAGNOSIS/MDM:   Vitals:    Vitals:    10/07/20 1240   BP: 109/70   Pulse: 79   Resp: 17   Temp: 97.8 °F (36.6 °C)   TempSrc: Oral   SpO2: 96%   Weight: 115 lb (52.2 kg)   Height: 5' 10\" (1.778 m)            MDM   Patient is a 62-year-old female presenting to the ER with a chief complaint of exposure to COVID-19 and COVID-like symptoms. Patient is hemodynamically stable nontoxic-appearing. She is swabbed for COVID and a chest x-ray is obtained. Patient will be reassessed. She is currently afebrile and her oxygen saturation is 96% on room air. Patient continues to be hemodynamically stable at this time. Her chest x-ray is negative. Her Covid swab is negative. Patient is instructed that is likely a viral infection. She is given a prescription for ProAir, Tessalon Perles, and prednisone course. She is instructed follow-up with primary care return back to the emergency department she worsens in any way. She is discharged in stable condition with stable vitals. CRITICAL CARE TIME       CONSULTS:  None    PROCEDURES:  Unless otherwise noted below, none     Procedures    FINAL IMPRESSION      1.  Viral illness          DISPOSITION/PLAN   DISPOSITION Decision To Discharge 10/07/2020 02:33:32 PM      PATIENT REFERRED TO:  Kimberly Officer, [address] [place] , [address] [place] [address]  [phone]    Schedule an appointment as soon as possible for a visit in 1 day        DISCHARGE MEDICATIONS:  New Prescriptions    ALBUTEROL SULFATE HFA (PROAIR HFA) 108 (90 BASE) MCG/ACT INHALER    Use every 4 hours while awake for 7-10 days then PRN wheezing  Dispense with SPACER and Instruct on use. May sub Ventolin or Proventil as needed per [employer].     BENZONATATE (TESSALON PERLES) 100 MG CAPSULE    Take 1 capsule by mouth 3 times daily as needed for Cough    PREDNISONE (DELTASONE) 20 MG TABLET    Take 2 tablets by mouth daily for 10 days          (Please notethat portions of this note were completed with a voice recognition program.  Efforts were made to edit the dictations but occasionally words are mis-transcribed.)    JENN Granados CNP (electronically signed)  Attending Emergency Physician          JENN Reyes CNP  10/07/20 2300

## 2020-10-07 NOTE — DISCHARGE INSTR - COC
kg)   SpO2 96%   BMI 16.50 kg/m²     Last documented pain score (0-10 scale): Pain Level: 10  Last Weight:   Wt Readings from Last 1 Encounters:   10/07/20 115 lb (52.2 kg)     Mental Status:  {IP PT MENTAL STATUS:}    IV Access:  { DAYSI IV ACCESS:474554893}    Nursing Mobility/ADLs:  Walking   {P DME SPCX:252840039}  Transfer  {P DME TBTW:053217192}  Bathing  {CHP DME MGZH:681554161}  Dressing  {CHP DME DNJA:046667365}  Toileting  {CHP DME XJBQ:974822444}  Feeding  {P DME REID:436908496}  Med Admin  {[facility] DME IMNN:313966049}  Med Delivery   { DAYSI MED Delivery:682613416}    Wound Care Documentation and Therapy:        Elimination:  Continence:   · Bowel: {YES / NU:84582}  · Bladder: {YES / TV:72272}  Urinary Catheter: {Urinary Catheter:403406014}   Colostomy/Ileostomy/Ileal Conduit: {YES / KU:38216}       Date of Last BM: ***  No intake or output data in the 24 hours ending 10/07/20 1445  No intake/output data recorded.     Safety Concerns:     508 5 examples Safety Concerns:946751806}    Impairments/Disabilities:      508 5 examples Impairments/Disabilities:825926154}    Nutrition Therapy:  Current Nutrition Therapy:   508 5 examples Diet List:709610236}    Routes of Feeding: {[facility] DME Other Feedings:378034992}  Liquids: {Slp liquid thickness:84362}  Daily Fluid Restriction: {CHP DME Yes amt example:948650619}  Last Modified Barium Swallow with Video (Video Swallowing Test): {Done Not Done BDCO:544746492}    Treatments at the Time of Hospital Discharge:   Respiratory Treatments: ***  Oxygen Therapy:  {Therapy; copd oxygen:93034}  Ventilator:    {[facility] Vent MJOB:081477424}    Rehab Therapies: {THERAPEUTIC INTERVENTION:6183752611}  Weight Bearing Status/Restrictions: [address]  Weight Bearin}  Other Medical Equipment (for information only, NOT a DME order):  {EQUIPMENT:427496777}  Other Treatments: ***    Patient's personal belongings (please select all that are sent with patient):  {[facility] DME Belongings:808094587}    RN SIGNATURE:  {Esignature:354659798}    CASE MANAGEMENT/SOCIAL WORK SECTION    Inpatient Status Date: ***    Readmission Risk Assessment Score:  Readmission Risk              Risk of Unplanned Readmission:        0           Discharging to Facility/ Agency   · Name:   · Address:  · Phone:  · Fax:    Dialysis Facility (if applicable)   · Name:  · Address:  · Dialysis Schedule:  · Phone:  · Fax:    / signature: {Esignature:785256897}    PHYSICIAN SECTION    Prognosis: {Prognosis:7912769801}    Condition at Discharge: [address] [place] [address] Patient Condition:749766725}    Rehab Potential (if transferring to Rehab): {Prognosis:4283268602}    Recommended Labs or Other Treatments After Discharge: ***    Physician Certification: I certify the above information and transfer of Jaguar Barbosa  is necessary for the continuing treatment of the diagnosis listed and that she requires {Admit to Appropriate Level of Care:35670} for {GREATER/LESS:005971549} 30 days.      Update Admission H&P: {CHP DME Changes in APVWE:505791129}    PHYSICIAN SIGNATURE:  {Esignature:545341113}

## 2020-10-08 ENCOUNTER — CARE COORDINATION (OUTPATIENT)
Dept: CARE COORDINATION | Age: 50
End: 2020-10-08

## 2020-10-08 NOTE — CARE COORDINATION
Date/Time:  10/8/2020 2:43 PM  Attempted to reach patient by telephone. Left HIPPA compliant message requesting a return call. Will attempt to reach patient again. MEDICATIONS:  · See Medication List (bring to your doctor appointments).  · Other:  Take medication as prescribed    VACCINES:  Most Recent Immunizations   Administered Date(s) Administered   • Influenza, injectable, quadrivalent, preservative-free 01/01/2018   • Influenza, seasonal, injectable, trivalent 09/26/2013   • Pneumococcal polysaccharide, adult, 23 valent 01/01/2018   • Td:Adult type tetanus/diphtheria 06/05/1981   • Vitamin B12 03/13/2014       ACTIVITY:  · Weigh yourself daily (first thing in the morning, with same amount of clothes on) unless told otherwise by your doctor.  · Continue activity as you were in the hospital, slowly increase to what you were doing previously.  · Up as desired / no restrictions.    SMOKING:  · Avoid all tobacco products and secondhand smoke.  · Smoking Cessation Counseling offered.  · [place] Toll Free Quit Line: [phone]    DIET:  · Limit salt and salty foods unless told otherwise by your doctor.  · No Restrictions    · Trouble breathing or chest pain - CALL 911    CONTACT YOUR DOCTOR IF:  · You have symptoms that are not \"normal\" for you.  · You have new or worse symptoms or pain, not relieved by medicine or rest.  · Temperature greater than 101 degrees F, chills or flu like symptoms.  · You gain more than 3 pounds in 2 days.  · Increased swelling, redness or drainage.

## 2020-10-09 ENCOUNTER — CARE COORDINATION (OUTPATIENT)
Dept: CARE COORDINATION | Age: 50
End: 2020-10-09

## 2020-10-09 NOTE — CARE COORDINATION
Date/Time:  10/9/2020 11:48 AM  Second attempt made to reach patient by telephone. Left HIPPA compliant message requesting a return call. Will attempt to reach patient again. Statement Selected Statement Selected Statement Selected

## 2020-10-10 LAB
SARS-COV-2: NOT DETECTED
SOURCE: NORMAL

## 2020-11-16 ENCOUNTER — HOSPITAL ENCOUNTER (EMERGENCY)
Age: 50
Discharge: LEFT BEFORE DISCHARGE | End: 2020-11-16
Payer: COMMERCIAL

## 2020-11-16 NOTE — ED NOTES
Called for pt, no answer in waiting room, pt not outside and pt not in bathroom     April L Liliam Houston, RN  11/16/20 1875

## 2022-08-05 ENCOUNTER — APPOINTMENT (OUTPATIENT)
Dept: GENERAL RADIOLOGY | Age: 52
End: 2022-08-05
Payer: COMMERCIAL

## 2022-08-05 ENCOUNTER — HOSPITAL ENCOUNTER (EMERGENCY)
Age: 52
Discharge: HOME | End: 2022-08-06
Payer: COMMERCIAL

## 2022-08-05 VITALS
RESPIRATION RATE: 20 BRPM | TEMPERATURE: 98.4 F | OXYGEN SATURATION: 94 % | HEART RATE: 65 BPM | WEIGHT: 110 LBS | DIASTOLIC BLOOD PRESSURE: 53 MMHG | BODY MASS INDEX: 15.75 KG/M2 | HEIGHT: 70 IN | SYSTOLIC BLOOD PRESSURE: 114 MMHG

## 2022-08-05 DIAGNOSIS — S63.501A SPRAIN OF RIGHT WRIST, INITIAL ENCOUNTER: Primary | ICD-10-CM

## 2022-08-05 PROCEDURE — 6360000002 HC RX W HCPCS: Performed by: PHYSICIAN ASSISTANT

## 2022-08-05 PROCEDURE — 96372 THER/PROPH/DIAG INJ SC/IM: CPT

## 2022-08-05 PROCEDURE — 99284 EMERGENCY DEPT VISIT MOD MDM: CPT

## 2022-08-05 PROCEDURE — 73110 X-RAY EXAM OF WRIST: CPT

## 2022-08-05 PROCEDURE — 73130 X-RAY EXAM OF HAND: CPT

## 2022-08-05 RX ORDER — KETOROLAC TROMETHAMINE 30 MG/ML
30 INJECTION, SOLUTION INTRAMUSCULAR; INTRAVENOUS ONCE
Status: COMPLETED | OUTPATIENT
Start: 2022-08-05 | End: 2022-08-05

## 2022-08-05 RX ADMIN — KETOROLAC TROMETHAMINE 30 MG: 30 INJECTION, SOLUTION INTRAMUSCULAR at 23:48

## 2022-08-05 ASSESSMENT — ENCOUNTER SYMPTOMS
TROUBLE SWALLOWING: 0
ABDOMINAL PAIN: 0
COLOR CHANGE: 0
SHORTNESS OF BREATH: 0
APNEA: 0
ALLERGIC/IMMUNOLOGIC NEGATIVE: 1
EYE PAIN: 0

## 2022-08-05 ASSESSMENT — PAIN SCALES - GENERAL
PAINLEVEL_OUTOF10: 7
PAINLEVEL_OUTOF10: 7

## 2022-08-05 ASSESSMENT — PAIN - FUNCTIONAL ASSESSMENT: PAIN_FUNCTIONAL_ASSESSMENT: 0-10

## 2022-08-05 ASSESSMENT — PAIN DESCRIPTION - ORIENTATION: ORIENTATION: RIGHT

## 2022-08-05 ASSESSMENT — PAIN DESCRIPTION - LOCATION: LOCATION: ARM

## 2022-08-05 ASSESSMENT — PAIN DESCRIPTION - PAIN TYPE: TYPE: ACUTE PAIN

## 2022-08-05 ASSESSMENT — LIFESTYLE VARIABLES
HOW MANY STANDARD DRINKS CONTAINING ALCOHOL DO YOU HAVE ON A TYPICAL DAY: PATIENT DOES NOT DRINK
HOW OFTEN DO YOU HAVE A DRINK CONTAINING ALCOHOL: NEVER

## 2022-08-05 ASSESSMENT — PAIN DESCRIPTION - DESCRIPTORS: DESCRIPTORS: ACHING

## 2022-08-06 RX ORDER — ETODOLAC 400 MG/1
400 TABLET, FILM COATED ORAL 2 TIMES DAILY
Qty: 14 TABLET | Refills: 0 | Status: SHIPPED | OUTPATIENT
Start: 2022-08-06
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
## 2022-08-11 ENCOUNTER — OFFICE VISIT (OUTPATIENT)
Dept: ORTHOPEDIC SURGERY | Age: 52
End: 2022-08-11
Payer: COMMERCIAL

## 2022-08-11 VITALS
WEIGHT: 110 LBS | TEMPERATURE: 98.7 F | OXYGEN SATURATION: 94 % | HEART RATE: 74 BPM | HEIGHT: 70 IN | BODY MASS INDEX: 15.75 KG/M2

## 2022-08-11 DIAGNOSIS — S60.211A CONTUSION OF RIGHT WRIST, INITIAL ENCOUNTER: Primary | ICD-10-CM
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
## 2022-08-11 DIAGNOSIS — S60.211A CONTUSION OF RIGHT WRIST, INITIAL ENCOUNTER: ICD-10-CM

## 2022-08-11 PROCEDURE — 99204 OFFICE O/P NEW MOD 45 MIN: CPT | Performed by: PHYSICIAN ASSISTANT

## 2022-08-11 PROCEDURE — S8451 SPLINT WRIST OR ANKLE: HCPCS | Performed by: PHYSICIAN ASSISTANT

## 2022-08-11 RX ORDER — HYDROCODONE BITARTRATE AND ACETAMINOPHEN 5; 325 MG/1; MG/1
1 TABLET ORAL EVERY 6 HOURS PRN
Qty: 20 TABLET | Refills: 0 | Status: CANCELLED | OUTPATIENT
Start: 2022-08-11 | End: 2022-08-16

## 2022-08-11 RX ORDER — HYDROCODONE BITARTRATE AND ACETAMINOPHEN 5; 325 MG/1; MG/1
1 TABLET ORAL EVERY 6 HOURS PRN
Qty: 20 TABLET | Refills: 0 | Status: SHIPPED | OUTPATIENT
Start: 2022-08-11 | End: 2022-08-22 | Stop reason: SDUPTHER

## 2022-08-11 RX ORDER — HYDROCODONE BITARTRATE AND ACETAMINOPHEN 5; 325 MG/1; MG/1
1 TABLET ORAL EVERY 6 HOURS PRN
Qty: 20 TABLET | Refills: 0 | Status: SHIPPED | OUTPATIENT
Start: 2022-08-11 | End: 2022-08-11
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
## 2022-08-18 ENCOUNTER — HOSPITAL ENCOUNTER (OUTPATIENT)
Dept: MRI IMAGING | Age: 52
Discharge: HOME | End: 2022-08-20
Payer: COMMERCIAL
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
## 2022-08-18 DIAGNOSIS — S60.211A CONTUSION OF RIGHT WRIST, INITIAL ENCOUNTER: ICD-10-CM

## 2022-08-18 PROCEDURE — 73221 MRI JOINT UPR EXTREM W/O DYE: CPT

## 2022-08-19 DIAGNOSIS — S60.211A CONTUSION OF RIGHT WRIST, INITIAL ENCOUNTER: ICD-10-CM

## 2022-08-22 ENCOUNTER — TELEPHONE (OUTPATIENT)
Dept: ORTHOPEDIC SURGERY | Age: 52
End: 2022-08-22

## 2022-08-22 RX ORDER — HYDROCODONE BITARTRATE AND ACETAMINOPHEN 5; 325 MG/1; MG/1
1 TABLET ORAL EVERY 6 HOURS PRN
Qty: 20 TABLET | Refills: 0 | Status: SHIPPED | OUTPATIENT
Start: 2022-08-22 | End: 2022-08-27

## 2022-08-22 NOTE — TELEPHONE ENCOUNTER
Pt called and would like a refill of her pain medication sent to [facility] on [address]

## 2022-08-23 DIAGNOSIS — S60.211A CONTUSION OF RIGHT WRIST, INITIAL ENCOUNTER: Primary | ICD-10-CM

## 2022-08-23 RX ORDER — HYDROCODONE BITARTRATE AND ACETAMINOPHEN 5; 325 MG/1; MG/1
1 TABLET ORAL EVERY 6 HOURS PRN
Qty: 20 TABLET | Refills: 0 | Status: SHIPPED | OUTPATIENT
Start: 2022-08-23 | End: 2022-08-26 | Stop reason: SDUPTHER

## 2022-08-24 ENCOUNTER — OFFICE VISIT (OUTPATIENT)
Dept: ORTHOPEDIC SURGERY | Age: 52
End: 2022-08-24
Payer: COMMERCIAL

## 2022-08-24 VITALS
OXYGEN SATURATION: 93 % | BODY MASS INDEX: 15.75 KG/M2 | TEMPERATURE: 97 F | HEIGHT: 70 IN | WEIGHT: 110 LBS | HEART RATE: 74 BPM

## 2022-08-24 DIAGNOSIS — S52.91XA CLOSED FRACTURE OF RIGHT RADIUS AND ULNA, INITIAL ENCOUNTER: Primary | ICD-10-CM

## 2022-08-24 DIAGNOSIS — S60.211A CONTUSION OF RIGHT WRIST, INITIAL ENCOUNTER: ICD-10-CM

## 2022-08-24 DIAGNOSIS — S52.201A CLOSED FRACTURE OF RIGHT RADIUS AND ULNA, INITIAL ENCOUNTER: Primary | ICD-10-CM

## 2022-08-24 PROCEDURE — 99213 OFFICE O/P EST LOW 20 MIN: CPT | Performed by: PHYSICIAN ASSISTANT

## 2022-08-24 PROCEDURE — L3908 WHO COCK-UP NONMOLDE PRE OTS: HCPCS | Performed by: PHYSICIAN ASSISTANT

## 2022-08-24 PROCEDURE — 25600 CLTX DST RDL FX/EPHYS SEP WO: CPT | Performed by: PHYSICIAN ASSISTANT
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
## 2022-08-26 DIAGNOSIS — S60.211A CONTUSION OF RIGHT WRIST, INITIAL ENCOUNTER: ICD-10-CM

## 2022-08-26 RX ORDER — HYDROCODONE BITARTRATE AND ACETAMINOPHEN 5; 325 MG/1; MG/1
1 TABLET ORAL EVERY 6 HOURS PRN
Qty: 20 TABLET | Refills: 0 | Status: SHIPPED | OUTPATIENT
Start: 2022-08-26 | End: 2022-08-31

## 2022-08-26 RX ORDER — HYDROCODONE BITARTRATE AND ACETAMINOPHEN 5; 325 MG/1; MG/1
1 TABLET ORAL EVERY 6 HOURS PRN
Qty: 20 TABLET | Refills: 0 | Status: CANCELLED | OUTPATIENT
Start: 2022-08-26 | End: 2022-08-31

## 2022-08-26 NOTE — TELEPHONE ENCOUNTER
Pt called stating she would like a refill of her [place] sent to [facility] in [facility]. Thank you.

## 2022-09-19 ENCOUNTER — TELEPHONE (OUTPATIENT)
Dept: ORTHOPEDIC SURGERY | Age: 52
End: 2022-09-19

## 2022-09-20 ENCOUNTER — TELEPHONE (OUTPATIENT)
Dept: ORTHOPEDIC SURGERY | Age: 52
End: 2022-09-20

## 2022-09-20 RX ORDER — NAPROXEN 500 MG/1
500 TABLET ORAL 2 TIMES DAILY WITH MEALS
Qty: 60 TABLET | Refills: 1 | Status: CANCELLED | OUTPATIENT
Start: 2022-09-20 | End: 2022-11-19

## 2022-09-20 RX ORDER — NAPROXEN 500 MG/1
500 TABLET ORAL 2 TIMES DAILY WITH MEALS
Qty: 60 TABLET | Refills: 1 | Status: SHIPPED | OUTPATIENT
Start: 2022-09-20 | End: 2022-11-19

## 2022-09-20 NOTE — TELEPHONE ENCOUNTER
Rx sent no more narcotics
I found him with open bottle of Augmentin 875mgs tablets.  I do not know how many tablets were in the bottle or how many were missing.

## 2023-04-04 ENCOUNTER — TELEPHONE (OUTPATIENT)
Dept: ORTHOPEDIC SURGERY | Age: 53
End: 2023-04-04

## 2024-06-19 ENCOUNTER — HOSPITAL ENCOUNTER (EMERGENCY)
Age: 54
Discharge: HOME | End: 2024-06-19
Payer: COMMERCIAL

## 2024-06-19 VITALS
OXYGEN SATURATION: 94 % | HEIGHT: 70 IN | WEIGHT: 105 LBS | RESPIRATION RATE: 17 BRPM | HEART RATE: 74 BPM | DIASTOLIC BLOOD PRESSURE: 64 MMHG | BODY MASS INDEX: 15.03 KG/M2 | SYSTOLIC BLOOD PRESSURE: 109 MMHG | TEMPERATURE: 98.2 F

## 2024-06-19 DIAGNOSIS — R55 SYNCOPE, UNSPECIFIED SYNCOPE TYPE: ICD-10-CM

## 2024-06-19 DIAGNOSIS — E86.0 DEHYDRATION: Primary | ICD-10-CM

## 2024-06-19 LAB
ALBUMIN SERPL-MCNC: 4.8 G/DL (ref 3.5–4.6)
ALP SERPL-CCNC: 149 U/L (ref 40–130)
ALT SERPL-CCNC: 8 U/L (ref 0–33)
ANION GAP SERPL CALCULATED.3IONS-SCNC: 12 MEQ/L (ref 9–15)
AST SERPL-CCNC: 13 U/L (ref 0–35)
BASOPHILS # BLD: 0.1 K/UL (ref 0–0.2)
BASOPHILS NFR BLD: 0.5 %
BILIRUB SERPL-MCNC: 0.4 MG/DL (ref 0.2–0.7)
BUN SERPL-MCNC: 6 MG/DL (ref 6–20)
CALCIUM SERPL-MCNC: 9.9 MG/DL (ref 8.5–9.9)
CHLORIDE SERPL-SCNC: 97 MEQ/L (ref 95–107)
CO2 SERPL-SCNC: 28 MEQ/L (ref 20–31)
CREAT SERPL-MCNC: 0.87 MG/DL (ref 0.5–0.9)
EOSINOPHIL # BLD: 0.1 K/UL (ref 0–0.7)
EOSINOPHIL NFR BLD: 0.7 %
ERYTHROCYTE [DISTWIDTH] IN BLOOD BY AUTOMATED COUNT: 12.9 % (ref 11.5–14.5)
GLOBULIN SER CALC-MCNC: 2.8 G/DL (ref 2.3–3.5)
GLUCOSE SERPL-MCNC: 83 MG/DL (ref 70–99)
HCT VFR BLD AUTO: 37.8 % (ref 37–47)
HGB BLD-MCNC: 12.9 G/DL (ref 12–16)
LYMPHOCYTES # BLD: 2.8 K/UL (ref 1–4.8)
LYMPHOCYTES NFR BLD: 28.6 %
MCH RBC QN AUTO: 32.6 PG (ref 27–31.3)
MCHC RBC AUTO-ENTMCNC: 34.1 % (ref 33–37)
MCV RBC AUTO: 95.5 FL (ref 79.4–94.8)
MONOCYTES # BLD: 0.7 K/UL (ref 0.2–0.8)
MONOCYTES NFR BLD: 7.3 %
NEUTROPHILS # BLD: 6.1 K/UL (ref 1.4–6.5)
NEUTS SEG NFR BLD: 62.7 %
PLATELET # BLD AUTO: 289 K/UL (ref 130–400)
POTASSIUM SERPL-SCNC: 4.1 MEQ/L (ref 3.4–4.9)
PROT SERPL-MCNC: 7.6 G/DL (ref 6.3–8)
RBC # BLD AUTO: 3.96 M/UL (ref 4.2–5.4)
SODIUM SERPL-SCNC: 137 MEQ/L (ref 135–144)
TROPONIN, HIGH SENSITIVITY: 7 NG/L (ref 0–19)
WBC # BLD AUTO: 9.8 K/UL (ref 4.8–10.8)

## 2024-06-19 PROCEDURE — 93005 ELECTROCARDIOGRAM TRACING: CPT | Performed by: PHYSICIAN ASSISTANT

## 2024-06-19 PROCEDURE — 2580000003 HC RX 258: Performed by: PHYSICIAN ASSISTANT

## 2024-06-19 PROCEDURE — 96360 HYDRATION IV INFUSION INIT: CPT

## 2024-06-19 PROCEDURE — 36415 COLL VENOUS BLD VENIPUNCTURE: CPT

## 2024-06-19 PROCEDURE — 85025 COMPLETE CBC W/AUTO DIFF WBC: CPT

## 2024-06-19 PROCEDURE — 99284 EMERGENCY DEPT VISIT MOD MDM: CPT

## 2024-06-19 PROCEDURE — 80053 COMPREHEN METABOLIC PANEL: CPT

## 2024-06-19 PROCEDURE — 84484 ASSAY OF TROPONIN QUANT: CPT

## 2024-06-19 RX ORDER — 0.9 % SODIUM CHLORIDE 0.9 %
1000 INTRAVENOUS SOLUTION INTRAVENOUS ONCE
Status: COMPLETED | OUTPATIENT
Start: 2024-06-19 | End: 2024-06-19

## 2024-06-19 RX ADMIN — SODIUM CHLORIDE 1000 ML: 9 INJECTION, SOLUTION INTRAVENOUS at 17:13

## 2024-06-19 ASSESSMENT — ENCOUNTER SYMPTOMS
CONSTIPATION: 0
ALLERGIC/IMMUNOLOGIC NEGATIVE: 1
PHOTOPHOBIA: 0
NAUSEA: 0
DIARRHEA: 0
EYE PAIN: 0
SORE THROAT: 0
ABDOMINAL PAIN: 0
SINUS PAIN: 0
CHEST TIGHTNESS: 0
COUGH: 1

## 2024-06-19 ASSESSMENT — PAIN - FUNCTIONAL ASSESSMENT: PAIN_FUNCTIONAL_ASSESSMENT: NONE - DENIES PAIN

## 2024-06-19 ASSESSMENT — LIFESTYLE VARIABLES
HOW OFTEN DO YOU HAVE A DRINK CONTAINING ALCOHOL: NEVER
HOW MANY STANDARD DRINKS CONTAINING ALCOHOL DO YOU HAVE ON A TYPICAL DAY: PATIENT DOES NOT DRINK

## 2024-06-19 NOTE — ED PROVIDER NOTES
[facility] ED  EMERGENCY DEPARTMENT ENCOUNTER      Pt Name: Shruthi Gutierrez  MRN: [id number]  Birthdate 1970  Date of evaluation: 6/19/2024  Provider: Mariusz Carcamo PA-C  4:18 PM EDT    CHIEF COMPLAINT       Chief Complaint   Patient presents with    Dehydration     Outside in jeans pulling weeds and passed out          HISTORY OF PRESENT ILLNESS   (Location/Symptom, Timing/Onset, Context/Setting, Quality, Duration, Modifying Factors, Severity)  Note limiting factors.   Shruthi Gutierrez is a 53 y.o. female with a 60 pack-year history who presents to the emergency department for fainting and dehydration.  Patient states she was out working in her garden on her knees in the grass and felt dehydrated. Next she knew she woke up to her daughter saying her name.  Patient does not believe she hit her head hard however she does endorse a mild diffuse headache.  Admits to decreased water intake.  Patient denies chest pain, shortness of breath, abdominal pain.    HPI    Nursing Notes were reviewed.    REVIEW OF SYSTEMS    (2-9 systems for level 4, 10 or more for level 5)     Review of Systems   Constitutional:  Negative for appetite change, fatigue and fever.   HENT:  Negative for congestion, postnasal drip, sinus pain and sore throat.    Eyes:  Negative for photophobia, pain and visual disturbance.   Respiratory:  Positive for cough. Negative for chest tightness.         Smoker's cough   Cardiovascular:  Negative for chest pain, palpitations and leg swelling.   Gastrointestinal:  Negative for abdominal pain, constipation, diarrhea and nausea.   Endocrine: Negative.    Genitourinary: Negative.    Musculoskeletal: Negative.    Skin: Negative.    Allergic/Immunologic: Negative.    Neurological:  Positive for syncope. Negative for dizziness, weakness, numbness and headaches.   Hematological: Negative.    Psychiatric/Behavioral:  Negative for agitation, behavioral problems and confusion.    All other systems

## 2024-06-19 NOTE — DISCHARGE INSTRUCTIONS
Drink plenty of fluids follow-up with primary care and cardiology.  Return to if any symptoms worsen or new symptoms develop.

## 2024-06-19 NOTE — ED NOTES
Patient deferred to be admitted at this time and wanted to go home. Patient was able to ambulate with out being dizzy or any SOB, patient states that she is feeling much better than when she arrived. Provider Wolf verdugo

## 2024-06-19 NOTE — ED TRIAGE NOTES
Patient arrived via private car due to pulling weeds  in her jeans and her black t-shirt and was kneeling then woke up with her daughter standing over her. Patient states that she is just dehydrated and needs fluids

## 2024-06-20 LAB
EKG ATRIAL RATE: 66 BPM
EKG P AXIS: 17 DEGREES
EKG P-R INTERVAL: 158 MS
EKG Q-T INTERVAL: 422 MS
EKG QRS DURATION: 88 MS
EKG QTC CALCULATION (BAZETT): 442 MS
EKG R AXIS: 53 DEGREES
EKG T AXIS: 77 DEGREES
EKG VENTRICULAR RATE: 66 BPM

## 2024-07-21 NOTE — ED TRIAGE NOTES
Pt to ER from home with c/o 3 days cough, congestion, N/V/D. Pt actively dry heaving in triage. Reports she has not been able to keep anything down, including fluids. Pt is pale, appears weak.  Pt A/O x 4, resps even and unlabored Spine appears normal, range of motion is not limited, no muscle or joint tenderness